# Patient Record
(demographics unavailable — no encounter records)

---

## 2024-12-23 NOTE — REASON FOR VISIT
[Initial Consultation] : an initial consultation for [Parent] : parent [FreeTextEntry2] : enlarged tonsils and adenoids

## 2024-12-23 NOTE — PHYSICAL EXAM
[Normal] : normal appearance, well groomed, well nourished, and in no acute distress [de-identified] : 3+ [TextEntry] : PHYSICAL EXAM:  CONSTITUTIONAL: well nourished, well developed, NON-DYSMORPHIC, and in no acute distress.    EYES: pupils equal and round and no abnormalities of the conjunctivae and lids.   RESPIRATORY: respirations unlabored, no increased work of breathing with use of accessory muscles and retractions. NO STRIDOR.  CARDIAC: warm extremities, no cyanosis. ABDOMEN: nondistended. THORAX: no gross deformities, no pectus defects.  NEUROLOGIC: cranial nerves II - VII and IX - XII with no gross deficits.  MUSCULOSKELETAL: normal muscle STRENGTH, symmetry and tone of facial, head and neck musculature, no clubbing.  INTEGUMENTARY: no obvious skin rash, no skin lesions. LYMPHATIC: no cervical lymphadenopathy.  PSYCHIATRIC: age APPROPRIATE behavior.  HEAD: normocephalic, atraumatic.  RIGHT EAR: The right pinna was normal. The right external auditory canal was normal and the right TYMPANIC MEMBRANE was intact and well aerated.   LEFT EAR: The left pinna was normal. The left external auditory canal was normal and the left TYMPANIC MEMBRANE was intact and well aerated.   NOSE: External Nose: normal  Anterior Nasal Cavity: the right nasal cavity was normal and the left nasal cavity was normal.  ORAL CAVITY/OROPHARYNX: normal mucosa  Right TONSIL Size: 3+ Left TONSIL Size: 3+   NECK: normal with no obvious cervical lesions

## 2024-12-23 NOTE — CONSULT LETTER
[Dear  ___] : Dear  [unfilled], [Consult Letter:] : I had the pleasure of evaluating your patient, [unfilled]. [Consult Closing:] : Thank you very much for allowing me to participate in the care of this patient.  If you have any questions, please do not hesitate to contact me. [Sincerely,] : Sincerely, [FreeTextEntry2] : Dr. Mikal Mtz [FreeTextEntry3] :            Marta Mcdaniels MD            Pediatric Otolaryngology/ Head & Neck Surgery      [facility]      , Otolaryngology; [facility]            [facility] [place] [address]

## 2024-12-23 NOTE — HISTORY OF PRESENT ILLNESS
[de-identified] : 3 year old female here for initial consultation for enlarged tonsils & adenoids Referred by [employer]  for possible T&A. The patient presents with a history of snoring with mouth breathing, choking more than GASPING  but no witnessed apnea at night when sleeping approx 3 months.   THERE IS  KNOWN daytime  FATIGUE  Wear pulls up at night There is no difficulty with hyperactivity/concentration.  Using Flonase x1 month at night with improvement of nasal congestion but continues to snore Uses humidifier nightly   No throat/tonsil infections.    No problems with ear infections, hearing, swallowing or with VPI/Speech/nasal regurgitation.  Passed NBHT AU.   Full term,  uncomplicated delivery with uncomplicated pregnancy.  No cyanosis, no ETT intubation, no home oxygen requirement NICU stay x2 weeks for opening of rectum hole.   Denies sleep study

## 2024-12-23 NOTE — ASSESSMENT
[FreeTextEntry1] : This child has increased weight which I believe is contributing to the illness. I spent an extensive amount of time counseling the family on weight loss and the importance of a moderate diet/exercise. Family will attempt to make changes or consider f/u with a nutritionist/dietician if PCP recommends.  This increased weight puts the patient at increased risk for perioperative complications, risk of postoperative failure and the need for CPAP, several days prolonged hospitalization, and possible need for prolonged mechanical ventilatory support. This child presents with a history of adenotonsillar hypertrophy and sleep disordered breathing.  I have also discussed with the family:      1.  A sleep study/overnight polysomnogram evaluation, along with a continued trial of intranasal steroids. I discussed the risks of nasal steroids (ex: Fluticasone, off label non FDA approved use) and proper application of steroids laterally in the nostrils (saline sprays may also be added in epistaxis is an issue) and the importance of consistency (but that prolonged use may cause growth issues).       2.  Given the patient's corresponding history and physical examination findings, I think that it is reasonable to proceed with a tonsillectomy and adenoidectomy.I have explained the risks of tonsillectomy and adenoidectomy including but not limited to general anesthesia, bleeding, infection, failure to extubate, injury to the teeth/lips/gums/local structures, tonsil and/or adenoid regrowth, persistence of symptoms, velopharyngeal insufficiency, nasopharyngeal stenosis, swallowing dysfunction, post-operative hemorrhage, voice changes, and possible need for further procedures. I have discussed with the family and offered two options to proceed.  The family opts for an adenotonsillectomy. The child will get postoperative acetaminophen alternating with ibuprofen, soft food and no strenuous activity/gym for 2 weeks, and one week away from school.